# Patient Record
Sex: FEMALE | Race: WHITE | NOT HISPANIC OR LATINO
[De-identification: names, ages, dates, MRNs, and addresses within clinical notes are randomized per-mention and may not be internally consistent; named-entity substitution may affect disease eponyms.]

---

## 2017-06-26 PROBLEM — M25.561 BILATERAL KNEE PAIN: Status: ACTIVE | Noted: 2017-06-26

## 2017-06-28 ENCOUNTER — APPOINTMENT (OUTPATIENT)
Dept: ORTHOPEDIC SURGERY | Facility: CLINIC | Age: 42
End: 2017-06-28

## 2017-06-28 DIAGNOSIS — S80.02XA CONTUSION OF LEFT KNEE, INITIAL ENCOUNTER: ICD-10-CM

## 2017-06-28 DIAGNOSIS — Z87.39 PERSONAL HISTORY OF OTHER DISEASES OF THE MUSCULOSKELETAL SYSTEM AND CONNECTIVE TISSUE: ICD-10-CM

## 2017-06-28 DIAGNOSIS — M25.561 PAIN IN RIGHT KNEE: ICD-10-CM

## 2017-06-28 DIAGNOSIS — M25.562 PAIN IN RIGHT KNEE: ICD-10-CM

## 2017-08-18 ENCOUNTER — APPOINTMENT (OUTPATIENT)
Dept: ORTHOPEDIC SURGERY | Facility: CLINIC | Age: 42
End: 2017-08-18

## 2017-08-21 ENCOUNTER — APPOINTMENT (OUTPATIENT)
Dept: ORTHOPEDIC SURGERY | Facility: CLINIC | Age: 42
End: 2017-08-21
Payer: COMMERCIAL

## 2017-08-21 VITALS — BODY MASS INDEX: 35.88 KG/M2 | WEIGHT: 195 LBS | HEIGHT: 62 IN

## 2017-08-21 DIAGNOSIS — M17.10 UNILATERAL PRIMARY OSTEOARTHRITIS, UNSPECIFIED KNEE: ICD-10-CM

## 2017-08-21 DIAGNOSIS — Z96.651 PRESENCE OF RIGHT ARTIFICIAL KNEE JOINT: ICD-10-CM

## 2017-08-21 DIAGNOSIS — Z78.9 OTHER SPECIFIED HEALTH STATUS: ICD-10-CM

## 2017-08-21 DIAGNOSIS — M25.561 PAIN IN RIGHT KNEE: ICD-10-CM

## 2017-08-21 DIAGNOSIS — Z96.652 PRESENCE OF LEFT ARTIFICIAL KNEE JOINT: ICD-10-CM

## 2017-08-21 DIAGNOSIS — Z87.39 PERSONAL HISTORY OF OTHER DISEASES OF THE MUSCULOSKELETAL SYSTEM AND CONNECTIVE TISSUE: ICD-10-CM

## 2017-08-21 DIAGNOSIS — S80.01XA CONTUSION OF RIGHT KNEE, INITIAL ENCOUNTER: ICD-10-CM

## 2017-08-21 PROCEDURE — 73564 X-RAY EXAM KNEE 4 OR MORE: CPT | Mod: 50

## 2017-08-21 PROCEDURE — 99214 OFFICE O/P EST MOD 30 MIN: CPT

## 2017-10-11 ENCOUNTER — EMERGENCY (EMERGENCY)
Facility: HOSPITAL | Age: 42
LOS: 0 days | Discharge: HOME | End: 2017-10-11
Attending: EMERGENCY MEDICINE | Admitting: EMERGENCY MEDICINE
Payer: COMMERCIAL

## 2017-10-11 VITALS — HEIGHT: 62 IN | WEIGHT: 214.95 LBS

## 2017-10-11 VITALS
TEMPERATURE: 98 F | SYSTOLIC BLOOD PRESSURE: 118 MMHG | RESPIRATION RATE: 18 BRPM | DIASTOLIC BLOOD PRESSURE: 76 MMHG | OXYGEN SATURATION: 96 % | HEART RATE: 87 BPM

## 2017-10-11 DIAGNOSIS — Z96.653 PRESENCE OF ARTIFICIAL KNEE JOINT, BILATERAL: Chronic | ICD-10-CM

## 2017-10-11 PROCEDURE — 73590 X-RAY EXAM OF LOWER LEG: CPT | Mod: 26,RT

## 2017-10-11 PROCEDURE — 73610 X-RAY EXAM OF ANKLE: CPT | Mod: 26,RT

## 2017-10-11 PROCEDURE — 93971 EXTREMITY STUDY: CPT | Mod: 26,RT

## 2017-10-11 PROCEDURE — 73562 X-RAY EXAM OF KNEE 3: CPT | Mod: 26,RT

## 2017-10-11 PROCEDURE — 99284 EMERGENCY DEPT VISIT MOD MDM: CPT

## 2017-10-11 RX ORDER — IBUPROFEN 200 MG
600 TABLET ORAL ONCE
Qty: 0 | Refills: 0 | Status: DISCONTINUED | OUTPATIENT
Start: 2017-10-11 | End: 2017-10-11

## 2017-10-11 NOTE — ED STATDOCS - ATTENDING CONTRIBUTION TO CARE
I, Jordon Jarvis, performed the initial face to face bedside interview with this patient regarding history of present illness, review of symptoms and relevant past medical, social and family history.  I completed an independent physical examination.  I was the initial provider who evaluated this patient. I have signed out the follow up of any pending tests (i.e. labs, radiological studies) to the ACP.  I have communicated the patient’s plan of care and disposition with the ACP.  The history, relevant review of systems, past medical and surgical history, medical decision making, and physical examination was documented by the scribe in my presence and I attest to the accuracy of the documentation.

## 2017-10-11 NOTE — ED STATDOCS - PROGRESS NOTE DETAILS
42 yr. old female PMH: OCD, ADHD, Anxiety [place]. Knee Partial knee replacement presents to ED with right knee pain.

## 2017-10-11 NOTE — ED STATDOCS - MUSCULOSKELETAL, MLM
(+) well-healed surgical scar to right knee, no erythema, no warmth, no effusion.  Full ROM.  No calf swelling.  Slight calf tenderness. Good DP pulses, NVI(+) well-healed surgical scar to right knee, no erythema, no warmth, no effusion.  Full ROM.  No calf swelling.  Slight calf tenderness. Good DP pulses, NVI.  range of motion is not limited

## 2017-10-12 DIAGNOSIS — M79.661 PAIN IN RIGHT LOWER LEG: ICD-10-CM

## 2017-10-12 DIAGNOSIS — M79.1 MYALGIA: ICD-10-CM

## 2017-10-12 DIAGNOSIS — F90.9 ATTENTION-DEFICIT HYPERACTIVITY DISORDER, UNSPECIFIED TYPE: ICD-10-CM

## 2017-10-12 DIAGNOSIS — Z96.653 PRESENCE OF ARTIFICIAL KNEE JOINT, BILATERAL: ICD-10-CM

## 2017-10-12 DIAGNOSIS — F42.9 OBSESSIVE-COMPULSIVE DISORDER, UNSPECIFIED: ICD-10-CM

## 2017-10-12 DIAGNOSIS — M79.604 PAIN IN RIGHT LEG: ICD-10-CM

## 2017-10-27 ENCOUNTER — APPOINTMENT (OUTPATIENT)
Dept: ORTHOPEDIC SURGERY | Facility: CLINIC | Age: 42
End: 2017-10-27

## 2017-11-10 ENCOUNTER — APPOINTMENT (OUTPATIENT)
Dept: ORTHOPEDIC SURGERY | Facility: CLINIC | Age: 42
End: 2017-11-10

## 2018-05-08 NOTE — ED STATDOCS - OBJECTIVE STATEMENT
Surgery time was changed to 11:30am and patient is aware of time change and arrival time.    43 y/o female with PMHx of OCD, ADHD, anxiety, b/l partial knee replacements (dr. Lugo, [facility]) presents to the ED c/o right knee pain today.  She states that when she puts weight on foot she feels excruciating pain in right knee, ankle, and pain.  Denies trauma to leg.  Took Motrin for pain and iced knee for relief.  Denies fever.

## 2019-10-28 ENCOUNTER — EMERGENCY (EMERGENCY)
Facility: HOSPITAL | Age: 44
LOS: 0 days | Discharge: HOME | End: 2019-10-28
Attending: EMERGENCY MEDICINE
Payer: COMMERCIAL

## 2019-10-28 VITALS — DIASTOLIC BLOOD PRESSURE: 88 MMHG | SYSTOLIC BLOOD PRESSURE: 139 MMHG

## 2019-10-28 VITALS — HEIGHT: 62 IN | WEIGHT: 220.02 LBS
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
## 2019-10-28 DIAGNOSIS — S86.002A UNSPECIFIED INJURY OF LEFT ACHILLES TENDON, INITIAL ENCOUNTER: ICD-10-CM

## 2019-10-28 DIAGNOSIS — M79.662 PAIN IN LEFT LOWER LEG: ICD-10-CM
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
## 2019-10-28 DIAGNOSIS — Z96.653 PRESENCE OF ARTIFICIAL KNEE JOINT, BILATERAL: Chronic | ICD-10-CM

## 2019-10-28 PROCEDURE — 29515 APPLICATION SHORT LEG SPLINT: CPT

## 2019-10-28 PROCEDURE — 99283 EMERGENCY DEPT VISIT LOW MDM: CPT

## 2019-10-28 PROCEDURE — 99283 EMERGENCY DEPT VISIT LOW MDM: CPT | Mod: 25

## 2019-10-28 PROCEDURE — 73630 X-RAY EXAM OF FOOT: CPT | Mod: LT

## 2019-10-28 PROCEDURE — 73630 X-RAY EXAM OF FOOT: CPT | Mod: 26,LT

## 2019-10-28 PROCEDURE — 29515 APPLICATION SHORT LEG SPLINT: CPT | Mod: LT

## 2019-10-28 RX ORDER — IBUPROFEN 200 MG
600 TABLET ORAL ONCE
Refills: 0 | Status: COMPLETED | OUTPATIENT
Start: 2019-10-28 | End: 2019-10-28

## 2019-10-28 RX ADMIN — Medication 600 MILLIGRAM(S): at 11:37

## 2019-10-28 RX ADMIN — Medication 600 MILLIGRAM(S): at 11:07

## 2019-10-28 NOTE — ED STATDOCS - CARE PROVIDERS DIRECT ADDRESSES
[email or web address]

## 2019-10-28 NOTE — ED ADULT NURSE NOTE - NSIMPLEMENTINTERV_GEN_ALL_ED
Implemented All Fall Risk Interventions:  [place]wen to call system. Call bell, personal items and telephone within reach. Instruct patient to call for assistance. Room bathroom lighting operational. Non-slip footwear when patient is off stretcher. Physically safe environment: no spills, clutter or unnecessary equipment. Stretcher in lowest position, wheels locked, appropriate side rails in place. Provide visual cue, wrist band, yellow gown, etc. Monitor gait and stability. Monitor for mental status changes and reorient to person, place, and time. Review medications for side effects contributing to fall risk. Reinforce activity limits and safety measures with patient and family.

## 2019-10-28 NOTE — ED ADULT TRIAGE NOTE - CHIEF COMPLAINT QUOTE
Pt presents to ED with left calf tightness and pain. Pt reports that she was walking down a ramp earlier at work when she felt extreme tightness and heard a pop. Pt unable to walk on left leg.Reports falling forward up two stairs earlier. did not hit her head, denies LOC. Pt placed in wheel chair in triage for safety

## 2019-10-28 NOTE — ED STATDOCS - CARE PROVIDER_API CALL
Juan Carlos Ann)  Orthopaedic Surgery  [address] [place] [address]  Phone: [phone]  Fax: [phone]  Follow Up Time:     Roc Delarosa)  Orthopaedic Surgery  [address], [address]  [place] [address]  Phone: [phone]  Fax: [phone]  Follow Up Time:

## 2019-10-28 NOTE — ED STATDOCS - PROGRESS NOTE DETAILS
45 y/o Female presents to ED c/o sudden onset of pain in calf today while walking.  Then when she was trying to go up 2 steps into her house, she fell forward onto knees.  Feels  pain at the back of her ankle.   On exam, mild swelling to Left posterior ankle  / achilles area.  Left Achilles tendon palp at posterior heel.  Neg deformity to achilles.  Alonzo Test reveals Achilles intact.  Left foot dorsiflexes.  NVI LE bilat.  Will give Motrin.  Xray.  Refer to Ortho.  Crutches for non-weight baring.  Demi Aparicio PA-C Xray shows Tendon attached to heel.  Applied posterior splint.  Crutvhes given.  Will dc home.  Refer to Ortho.  Demi Aparicio PA-C Xray shows Tendon attached to heel.  Applied posterior splint.  Crutches given for non-weight bearing.  Will dc home.  Refer to Ortho.  Nsaids for pain.  Recommend COOKIE.  Demi Aparicio PA-C

## 2019-10-28 NOTE — ED STATDOCS - OBJECTIVE STATEMENT
43 y/o female with no relevant PMHx presents to the ED c/o left calf pain x today. Pt states that she was walking down a ramp today when she felt her calf "pop" after feeling some tightness. Pt now unable to walk on her left leg. No other injury or concern.

## 2019-10-28 NOTE — ED ADULT NURSE NOTE - OBJECTIVE STATEMENT
patient comes in after having lower leg tightness and pain. patient state she felt a pop and is unable to bear weight on L. patient denies numbness/tingling. no signs of acute distress noted.

## 2019-10-28 NOTE — ED STATDOCS - PATIENT PORTAL LINK FT
You can access the FollowMyHealth Patient Portal offered by [facility] by registering at the following website: [email or web address]. By joining [employer]’s FollowMyHealth portal, you will also be able to view your health information using other applications (apps) compatible with our system.

## 2019-11-02 NOTE — ED PROCEDURE NOTE - PROCEDURE ADDITIONAL DETAILS
Refer to Ortho for further eval of Left Achilles tendon  / Calf muscle injury.  HAYDER edmonds PA-C  non-weight baring with crutches.

## 2020-03-31 ENCOUNTER — EMERGENCY (EMERGENCY)
Facility: HOSPITAL | Age: 45
LOS: 0 days | Discharge: HOME | End: 2020-03-31
Payer: COMMERCIAL

## 2020-03-31 VITALS
DIASTOLIC BLOOD PRESSURE: 83 MMHG | RESPIRATION RATE: 18 BRPM | TEMPERATURE: 99 F | OXYGEN SATURATION: 93 % | SYSTOLIC BLOOD PRESSURE: 137 MMHG | HEART RATE: 86 BPM

## 2020-03-31 DIAGNOSIS — J34.89 OTHER SPECIFIED DISORDERS OF NOSE AND NASAL SINUSES: ICD-10-CM

## 2020-03-31 DIAGNOSIS — R05 COUGH: ICD-10-CM

## 2020-03-31 DIAGNOSIS — Z96.653 PRESENCE OF ARTIFICIAL KNEE JOINT, BILATERAL: Chronic | ICD-10-CM

## 2020-03-31 DIAGNOSIS — R50.9 FEVER, UNSPECIFIED: ICD-10-CM

## 2020-03-31 DIAGNOSIS — B34.9 VIRAL INFECTION, UNSPECIFIED: ICD-10-CM

## 2020-03-31 PROCEDURE — 99283 EMERGENCY DEPT VISIT LOW MDM: CPT

## 2020-03-31 PROCEDURE — 87635 SARS-COV-2 COVID-19 AMP PRB: CPT

## 2020-03-31 NOTE — ED STATDOCS - NS ED MD DISPO DISCHARGE CCDA
I have supervised and reviewed the notes, assessments, and/or procedures performed by our PharmD Candidate. The documented assessment and plan were developed cooperatively, and the plan was implemented in my presence. I concur with the documentation of this patient encounter.    Eve Bauer [facility]      Patient/Caregiver provided printed discharge information.

## 2020-03-31 NOTE — ED STATDOCS - NS ED ROS FT
ROS: Constitutional- +fever, +chills.  Respiratory- +cough, +SOB  Cardiac-+ chest pain on deep inspiration, no palpitations, ENT- -rhinorrhea, + sore throat, no congestion.  Abdomen- No nausea, no vomiting, no diarrhea.  Urinary- no dysuria, no urgency, no frequency.  Skin- No rashes

## 2020-03-31 NOTE — ED ADULT NURSE NOTE - OBJECTIVE STATEMENT
PATIENT WAS TREATED, EVALUATED AND DISCHARGED BY INTAKE PROVIDER. PLEASE SEE PROVIDER NOTE FOR ASSESSMENT.  DISCHARGE INSTRUCTIONS REVIEWED WITH PATIENT VERBALLY, PT VERBALIZED UNDERSTANDING OF DISCHARGE INSTRUCTIONS. PAPER COPY OF DISCHARGE INSTRUCTIONS GIVEN TO PATIENT WITH SELF ISSAC AND COVID 19 INFORMATION.

## 2020-03-31 NOTE — ED STATDOCS - OBJECTIVE STATEMENT
44yr. old female PMH: ADHD, Anxiety  presents to ED with fever, cough, runny nose, body aches, sore throat x 4 days. Pt?? recently exposed to COVID-19. Pt here for testing.

## 2020-03-31 NOTE — ED STATDOCS - PATIENT PORTAL LINK FT
You can access the FollowMyHealth Patient Portal offered by [facility] by registering at the following website: [email or web address]. By joining [employer]’s FollowMyHealth portal, you will also be able to view your health information using other applications (apps) compatible with our system.

## 2020-04-02 LAB — SARS-COV-2 RNA SPEC QL NAA+PROBE: SIGNIFICANT CHANGE UP

## 2020-12-07 ENCOUNTER — OUTPATIENT (OUTPATIENT)
Dept: OUTPATIENT SERVICES | Facility: HOSPITAL | Age: 45
LOS: 1 days | End: 2020-12-07
Payer: COMMERCIAL

## 2020-12-07 DIAGNOSIS — Z11.59 ENCOUNTER FOR SCREENING FOR OTHER VIRAL DISEASES: ICD-10-CM

## 2020-12-07 DIAGNOSIS — Z96.653 PRESENCE OF ARTIFICIAL KNEE JOINT, BILATERAL: Chronic | ICD-10-CM

## 2020-12-07 LAB — SARS-COV-2 RNA SPEC QL NAA+PROBE: SIGNIFICANT CHANGE UP

## 2020-12-07 PROCEDURE — U0003: CPT

## 2020-12-08 DIAGNOSIS — Z11.59 ENCOUNTER FOR SCREENING FOR OTHER VIRAL DISEASES: ICD-10-CM

## 2022-01-10 ENCOUNTER — OUTPATIENT (OUTPATIENT)
Dept: OUTPATIENT SERVICES | Facility: HOSPITAL | Age: 47
LOS: 1 days | End: 2022-01-10
Payer: MEDICARE

## 2022-01-10 DIAGNOSIS — Z96.653 PRESENCE OF ARTIFICIAL KNEE JOINT, BILATERAL: Chronic | ICD-10-CM

## 2022-01-10 DIAGNOSIS — Z20.828 CONTACT WITH AND (SUSPECTED) EXPOSURE TO OTHER VIRAL COMMUNICABLE DISEASES: ICD-10-CM

## 2022-01-10 LAB — SARS-COV-2 RNA SPEC QL NAA+PROBE: SIGNIFICANT CHANGE UP

## 2022-01-10 PROCEDURE — C9803: CPT

## 2022-01-10 PROCEDURE — U0005: CPT

## 2022-01-10 PROCEDURE — U0003: CPT

## 2022-01-11 DIAGNOSIS — Z20.828 CONTACT WITH AND (SUSPECTED) EXPOSURE TO OTHER VIRAL COMMUNICABLE DISEASES: ICD-10-CM

## 2022-07-25 ENCOUNTER — NON-APPOINTMENT (OUTPATIENT)
Age: 47
End: 2022-07-25

## 2023-06-03 ENCOUNTER — NON-APPOINTMENT (OUTPATIENT)
Age: 48
End: 2023-06-03

## 2023-06-06 ENCOUNTER — APPOINTMENT (OUTPATIENT)
Dept: NEUROLOGY | Facility: CLINIC | Age: 48
End: 2023-06-06
Payer: MEDICAID

## 2023-06-06 VITALS
BODY MASS INDEX: 47.77 KG/M2 | HEIGHT: 61 IN | TEMPERATURE: 97.6 F | DIASTOLIC BLOOD PRESSURE: 72 MMHG | SYSTOLIC BLOOD PRESSURE: 105 MMHG | WEIGHT: 253 LBS | HEART RATE: 101 BPM

## 2023-06-06 DIAGNOSIS — F98.8 OTHER SPECIFIED BEHAVIORAL AND EMOTIONAL DISORDERS WITH ONSET USUALLY OCCURRING IN CHILDHOOD AND ADOLESCENCE: ICD-10-CM

## 2023-06-06 DIAGNOSIS — Z86.59 PERSONAL HISTORY OF OTHER MENTAL AND BEHAVIORAL DISORDERS: ICD-10-CM

## 2023-06-06 DIAGNOSIS — F41.9 ANXIETY DISORDER, UNSPECIFIED: ICD-10-CM

## 2023-06-06 PROCEDURE — 99204 OFFICE O/P NEW MOD 45 MIN: CPT

## 2023-06-06 NOTE — DISCUSSION/SUMMARY
[FreeTextEntry1] : 47-year-old woman, history of anxiety disorder, compulsive disorder, with good response in the past with Prozac 40 mg daily.  Because of insurance issues and not be able to obtain it.\par Reviewed and discussed treatment.\par Plan: Restart Prozac 20 mg p.o. daily\par History of attention deficit disorder, previously on Adderall, and because of an ongoing nationwide shortage, had difficulty getting it, changing physicians.  Tried Concerta, but did not the side effects.\par Reviewed and discussed treatment options.\par Plan: Trial with Vyvanse 30 mg p.o. daily 15-day supply.\par Patient will call with an update in 2 weeks.\par Return to office, in 2 months.

## 2023-06-06 NOTE — HISTORY OF PRESENT ILLNESS
[FreeTextEntry1] : 37-year-old woman, [facility], reports was in the past followed by neurologist in the emergency room insurance, and now seeking further care.  Diagnosed with anxiety disorder, obsessive-compulsive features, previously treated with Prozac 40 mg once a day, with good results.  Last time she took the medication was the end of last year.  Since then becomes anxious, trouble is related also to her difficulty with attention deficit problems, trying to decide what is important, ancestry is frozen and ability to decide which is important, also gets more anxious and is crying if she can get her work done.  She is going to school, currently finishing grad school.  Has 2 children one of them with attention deficit and autism.  Holding a full-time job.\par History of attention deficit, has trouble maintaining herself on task, gets easily distracted, procrastination, has to wait for the last minute and try to accomplish multiple tasks.  Always feels overwhelmed, which makes her anxiety worse.  Previously treated with Adderall Exar 30 mg and 10 mg in the morning to good effect, but because of issues trying to obtain Adderall, was tried on Concerta which she said was not as effective or increased anxiety.\par Has not taking any of the stimulant medication now for least a year.

## 2023-06-06 NOTE — REVIEW OF SYSTEMS
[Decr. Concentrating Ability] : decreased concentrating ability [Sleep Disturbances] : sleep disturbances [Anxiety] : anxiety [Emotional Problems] : emotional problems [Negative] : Heme/Lymph

## 2023-06-06 NOTE — PHYSICAL EXAM
[General Appearance - Alert] : alert [General Appearance - In No Acute Distress] : in no acute distress [Oriented To Time, Place, And Person] : oriented to person, place, and time [Impaired Insight] : insight and judgment were intact [Affect] : the affect was normal [Person] : oriented to person [Place] : oriented to place [Time] : oriented to time [Fluency] : fluency intact [Comprehension] : comprehension intact [Past History] : adequate knowledge of personal past history [Cranial Nerves Optic (II)] : visual acuity intact bilaterally,  visual fields full to confrontation, pupils equal round and reactive to light [Cranial Nerves Oculomotor (III)] : extraocular motion intact [Cranial Nerves Facial (VII)] : face symmetrical [Cranial Nerves Vestibulocochlear (VIII)] : hearing was intact bilaterally [Cranial Nerves Accessory (XI - Cranial And Spinal)] : head turning and shoulder shrug symmetric [Motor Strength] : muscle strength was normal in all four extremities [Motor Handedness Right-Handed] : the patient is right hand dominant [Abnormal Walk] : normal gait [Limited Balance] : balance was intact [Past-pointing] : there was no past-pointing [Dysdiadochokinesia Bilaterally] : not present

## 2023-06-09 ENCOUNTER — TRANSCRIPTION ENCOUNTER (OUTPATIENT)
Age: 48
End: 2023-06-09

## 2023-07-09 ENCOUNTER — TRANSCRIPTION ENCOUNTER (OUTPATIENT)
Age: 48
End: 2023-07-09

## 2023-08-30 ENCOUNTER — APPOINTMENT (OUTPATIENT)
Dept: NEUROLOGY | Facility: CLINIC | Age: 48
End: 2023-08-30
Payer: COMMERCIAL

## 2023-08-30 VITALS
BODY MASS INDEX: 46.26 KG/M2 | HEIGHT: 61 IN | TEMPERATURE: 98.2 F | SYSTOLIC BLOOD PRESSURE: 126 MMHG | HEART RATE: 115 BPM | WEIGHT: 245 LBS | DIASTOLIC BLOOD PRESSURE: 84 MMHG

## 2023-08-30 PROCEDURE — 99213 OFFICE O/P EST LOW 20 MIN: CPT

## 2023-08-30 RX ORDER — LISDEXAMFETAMINE DIMESYLATE 40 MG/1
40 CAPSULE ORAL
Qty: 30 | Refills: 0 | Status: DISCONTINUED | COMMUNITY
Start: 2023-06-06 | End: 2023-08-30

## 2023-08-30 RX ORDER — DEXTROAMPHETAMINE SACCHARATE, AMPHETAMINE ASPARTATE MONOHYDRATE, DEXTROAMPHETAMINE SULFATE AND AMPHETAMINE SULFATE 5; 5; 5; 5 MG/1; MG/1; MG/1; MG/1
20 CAPSULE, EXTENDED RELEASE ORAL
Qty: 60 | Refills: 0 | Status: ACTIVE | COMMUNITY
Start: 2023-08-30 | End: 1900-01-01

## 2023-08-30 NOTE — HISTORY OF PRESENT ILLNESS
[FreeTextEntry1] : 47-year-old woman right-handed history of attention deficit disorder, anxiety disorder, here for follow-up visit reports since being on Prozac 40 mg once a day, anxiety is better controlled, mood is stable.  No problem with the medications. History of attention deficit, did well on Adderall, but has having difficulty obtaining the medication failed.  Tried Concerta in the past, more recently Vyvanse but unable to tolerate it.

## 2023-08-30 NOTE — REVIEW OF SYSTEMS
[Decr. Concentrating Ability] : decreased concentrating ability [As Noted in HPI] : as noted in HPI [Negative] : Heme/Lymph

## 2023-08-30 NOTE — DISCUSSION/SUMMARY
[FreeTextEntry1] : 47-year-old woman history of anxiety disorder, stable on Prozac 40 mg once a day. Reviewed treatment, no change at this time. History of attention deficit, unable to tolerate Vyvanse, had difficulty with Concerta.  Good response to Adderall but having difficulty obtaining it. Discussed and reviewed options. Plan: Adderall ER 20 mg, 2 tablets daily. Patient will call for refills as needed. Return to office, 4 to 6 months.

## 2023-09-29 ENCOUNTER — NON-APPOINTMENT (OUTPATIENT)
Age: 48
End: 2023-09-29

## 2023-11-01 ENCOUNTER — TRANSCRIPTION ENCOUNTER (OUTPATIENT)
Age: 48
End: 2023-11-01

## 2023-11-01 RX ORDER — DEXTROAMPHETAMINE SACCHARATE, AMPHETAMINE ASPARTATE MONOHYDRATE, DEXTROAMPHETAMINE SULFATE AND AMPHETAMINE SULFATE 2.5; 2.5; 2.5; 2.5 MG/1; MG/1; MG/1; MG/1
10 CAPSULE, EXTENDED RELEASE ORAL
Qty: 30 | Refills: 0 | Status: ACTIVE | COMMUNITY
Start: 1900-01-01 | End: 1900-01-01

## 2023-11-17 RX ORDER — DEXTROAMPHETAMINE SACCHARATE, AMPHETAMINE ASPARTATE MONOHYDRATE, DEXTROAMPHETAMINE SULFATE AND AMPHETAMINE SULFATE 7.5; 7.5; 7.5; 7.5 MG/1; MG/1; MG/1; MG/1
30 CAPSULE, EXTENDED RELEASE ORAL
Qty: 30 | Refills: 0 | Status: ACTIVE | COMMUNITY
Start: 1900-01-01 | End: 1900-01-01

## 2023-11-29 ENCOUNTER — RX RENEWAL (OUTPATIENT)
Age: 48
End: 2023-11-29

## 2023-11-29 RX ORDER — FLUOXETINE HYDROCHLORIDE 40 MG/1
40 CAPSULE ORAL
Qty: 90 | Refills: 0 | Status: ACTIVE | COMMUNITY
Start: 1900-01-01 | End: 1900-01-01

## 2024-01-17 ENCOUNTER — APPOINTMENT (OUTPATIENT)
Dept: NEUROLOGY | Facility: CLINIC | Age: 49
End: 2024-01-17

## 2024-04-08 ENCOUNTER — APPOINTMENT (OUTPATIENT)
Dept: NEUROLOGY | Facility: CLINIC | Age: 49
End: 2024-04-08

## 2024-09-06 ENCOUNTER — APPOINTMENT (OUTPATIENT)
Dept: NEUROLOGY | Facility: CLINIC | Age: 49
End: 2024-09-06
Payer: COMMERCIAL

## 2024-09-06 PROCEDURE — 99214 OFFICE O/P EST MOD 30 MIN: CPT

## 2024-09-06 RX ORDER — VILOXAZINE HYDROCHLORIDE 100 MG/1
100 CAPSULE, EXTENDED RELEASE ORAL
Qty: 30 | Refills: 0 | Status: ACTIVE | COMMUNITY
Start: 2024-09-06 | End: 1900-01-01

## 2024-09-06 NOTE — DISCUSSION/SUMMARY
[FreeTextEntry1] : 48-year-old woman with a history of anxiety, with in the past fairly good response to fluoxetine 40 mg daily.  Seeing a therapist. Reviewed options, will continue with fluoxetine 40 mg daily. History of attention deficit disorder, in the past good response to stimulants but was advised by cardiologist not to be on them. Reviewed and discussed options. Try Qelbee 100 mg once a day and take in the evening.  Try for 1 month, give me a call back then, may need to be increased. Return to office, 3 to 4 months.

## 2024-09-06 NOTE — REVIEW OF SYSTEMS
[Recent Weight Gain (___ Lbs)] : recent [unfilled] ~Ulb weight gain [Memory Lapses or Loss] : memory loss [Decr. Concentrating Ability] : decreased concentrating ability [As Noted in HPI] : as noted in HPI [Suicidal] : not suicidal [Sleep Disturbances] : sleep disturbances [Anxiety] : anxiety [Depression] : no depression [Change In Personality] : no personality change [Emotional Problems] : no emotional problems [Negative] : Heme/Lymph

## 2024-09-06 NOTE — REASON FOR VISIT
[Follow-Up: _____] : a [unfilled] follow-up visit [Home] : at home, [unfilled] , at the time of the visit. [Medical Office: ([facility])___] : at the medical office located in  [Patient] : the patient

## 2024-09-06 NOTE — PHYSICAL EXAM
[General Appearance - Alert] : alert [General Appearance - In No Acute Distress] : in no acute distress [General Appearance - Well-Appearing] : healthy appearing [] : normal voice and communication [FreeTextEntry1] : Obese [Oriented To Time, Place, And Person] : oriented to person, place, and time [Impaired Insight] : insight and judgment were intact [Affect] : the affect was normal [Mood] : the mood was normal [Memory Recent] : recent memory was not impaired [Memory Remote] : remote memory was not impaired

## 2024-09-27 NOTE — ED ADULT NURSE NOTE - NS ED NOTE  TALK SOMEONE YN
[FreeTextEntry1] : Stable Continue present medication Check routine blood work Advised screening colonoscopy Has agreed to low-dose lung cancer screening
No

## 2024-10-01 RX ORDER — ATOMOXETINE 25 MG/1
25 CAPSULE ORAL
Qty: 69 | Refills: 1 | Status: ACTIVE | COMMUNITY
Start: 2024-10-01 | End: 1900-01-01

## 2024-11-29 RX ORDER — ATOMOXETINE 60 MG/1
60 CAPSULE ORAL
Qty: 30 | Refills: 2 | Status: ACTIVE | COMMUNITY
Start: 2024-11-29 | End: 1900-01-01

## 2025-09-03 ENCOUNTER — EMERGENCY (EMERGENCY)
Facility: HOSPITAL | Age: 50
LOS: 0 days | Discharge: HOME | End: 2025-09-03
Attending: EMERGENCY MEDICINE
Payer: COMMERCIAL

## 2025-09-03 VITALS
TEMPERATURE: 98 F | OXYGEN SATURATION: 100 % | DIASTOLIC BLOOD PRESSURE: 70 MMHG | HEART RATE: 97 BPM | SYSTOLIC BLOOD PRESSURE: 118 MMHG | RESPIRATION RATE: 18 BRPM

## 2025-09-03 VITALS — WEIGHT: 285.06 LBS

## 2025-09-03 DIAGNOSIS — Z96.653 PRESENCE OF ARTIFICIAL KNEE JOINT, BILATERAL: Chronic | ICD-10-CM
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
## 2025-09-03 DIAGNOSIS — M25.572 PAIN IN LEFT ANKLE AND JOINTS OF LEFT FOOT: ICD-10-CM

## 2025-09-03 DIAGNOSIS — M79.89 OTHER SPECIFIED SOFT TISSUE DISORDERS: ICD-10-CM

## 2025-09-03 DIAGNOSIS — M25.562 PAIN IN LEFT KNEE: ICD-10-CM

## 2025-09-03 DIAGNOSIS — S76.112A STRAIN OF LEFT QUADRICEPS MUSCLE, FASCIA AND TENDON, INITIAL ENCOUNTER: ICD-10-CM
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
## 2025-09-03 PROCEDURE — 99285 EMERGENCY DEPT VISIT HI MDM: CPT

## 2025-09-03 PROCEDURE — 73600 X-RAY EXAM OF ANKLE: CPT | Mod: LT

## 2025-09-03 PROCEDURE — 73600 X-RAY EXAM OF ANKLE: CPT | Mod: 26,LT

## 2025-09-03 PROCEDURE — 73590 X-RAY EXAM OF LOWER LEG: CPT | Mod: 26,LT

## 2025-09-03 PROCEDURE — 99284 EMERGENCY DEPT VISIT MOD MDM: CPT | Mod: 25

## 2025-09-03 PROCEDURE — 73590 X-RAY EXAM OF LOWER LEG: CPT | Mod: LT

## 2025-09-03 PROCEDURE — 73564 X-RAY EXAM KNEE 4 OR MORE: CPT | Mod: 26,LT

## 2025-09-03 PROCEDURE — 73630 X-RAY EXAM OF FOOT: CPT | Mod: LT

## 2025-09-03 PROCEDURE — 73564 X-RAY EXAM KNEE 4 OR MORE: CPT | Mod: LT

## 2025-09-03 PROCEDURE — 73630 X-RAY EXAM OF FOOT: CPT | Mod: 26,LT

## 2025-09-03 RX ORDER — ACETAMINOPHEN 500 MG/5ML
1000 LIQUID (ML) ORAL ONCE
Refills: 0 | Status: COMPLETED | OUTPATIENT
Start: 2025-09-03 | End: 2025-09-03

## 2025-09-03 RX ADMIN — Medication 1000 MILLIGRAM(S): at 10:52
